# Patient Record
Sex: FEMALE | Race: WHITE
[De-identification: names, ages, dates, MRNs, and addresses within clinical notes are randomized per-mention and may not be internally consistent; named-entity substitution may affect disease eponyms.]

---

## 2017-03-22 ENCOUNTER — HOSPITAL ENCOUNTER (OUTPATIENT)
Age: 57
Discharge: HOME | End: 2017-03-22
Payer: COMMERCIAL

## 2017-03-22 DIAGNOSIS — Z98.84: ICD-10-CM

## 2017-03-22 DIAGNOSIS — E03.9: Primary | ICD-10-CM

## 2017-07-28 ENCOUNTER — HOSPITAL ENCOUNTER (OUTPATIENT)
Dept: HOSPITAL 21 - PCC | Age: 57
Setting detail: OBSERVATION
LOS: 2 days | Discharge: HOME | End: 2017-07-30
Attending: FAMILY MEDICINE | Admitting: FAMILY MEDICINE
Payer: COMMERCIAL

## 2017-07-28 ENCOUNTER — HOSPITAL ENCOUNTER (EMERGENCY)
Dept: HOSPITAL 76 - ED | Age: 57
Discharge: ANOTHER HOSPITAL | End: 2017-07-28
Payer: COMMERCIAL

## 2017-07-28 ENCOUNTER — HOSPITAL ENCOUNTER (OUTPATIENT)
Dept: HOSPITAL 76 - EMS | Age: 57
Discharge: ANOTHER HOSPITAL | End: 2017-07-28
Attending: SURGERY
Payer: COMMERCIAL

## 2017-07-28 ENCOUNTER — HOSPITAL ENCOUNTER (OUTPATIENT)
Dept: HOSPITAL 76 - EMS | Age: 57
Discharge: ANOTHER HOSPITAL | End: 2017-07-28
Attending: SURGERY
Payer: COMMERCIAL

## 2017-07-28 VITALS — DIASTOLIC BLOOD PRESSURE: 61 MMHG | SYSTOLIC BLOOD PRESSURE: 112 MMHG

## 2017-07-28 VITALS
OXYGEN SATURATION: 100 % | RESPIRATION RATE: 11 BRPM | DIASTOLIC BLOOD PRESSURE: 54 MMHG | SYSTOLIC BLOOD PRESSURE: 122 MMHG | HEART RATE: 52 BPM

## 2017-07-28 VITALS
RESPIRATION RATE: 12 BRPM | DIASTOLIC BLOOD PRESSURE: 59 MMHG | OXYGEN SATURATION: 97 % | SYSTOLIC BLOOD PRESSURE: 132 MMHG | HEART RATE: 56 BPM

## 2017-07-28 VITALS — WEIGHT: 153.44 LBS | BODY MASS INDEX: 24.37 KG/M2 | HEIGHT: 66.5 IN

## 2017-07-28 DIAGNOSIS — I45.10: ICD-10-CM

## 2017-07-28 DIAGNOSIS — R07.9: ICD-10-CM

## 2017-07-28 DIAGNOSIS — R07.89: Primary | ICD-10-CM

## 2017-07-28 DIAGNOSIS — Z79.01: ICD-10-CM

## 2017-07-28 DIAGNOSIS — D64.9: ICD-10-CM

## 2017-07-28 DIAGNOSIS — E03.9: ICD-10-CM

## 2017-07-28 DIAGNOSIS — Z98.84: ICD-10-CM

## 2017-07-28 DIAGNOSIS — I21.3: Primary | ICD-10-CM

## 2017-07-28 DIAGNOSIS — R74.0: ICD-10-CM

## 2017-07-28 DIAGNOSIS — Z79.82: ICD-10-CM

## 2017-07-28 DIAGNOSIS — R73.03: ICD-10-CM

## 2017-07-28 DIAGNOSIS — R94.31: ICD-10-CM

## 2017-07-28 DIAGNOSIS — I21.4: Primary | ICD-10-CM

## 2017-07-28 DIAGNOSIS — E66.01: ICD-10-CM

## 2017-07-28 DIAGNOSIS — R07.9: Primary | ICD-10-CM

## 2017-07-28 DIAGNOSIS — G43.909: ICD-10-CM

## 2017-07-28 DIAGNOSIS — I10: ICD-10-CM

## 2017-07-28 DIAGNOSIS — F32.9: ICD-10-CM

## 2017-07-28 LAB
ALBUMIN/GLOB SERPL: 1.2 {RATIO} (ref 1–2.2)
ANION GAP SERPL CALCULATED.4IONS-SCNC: 8 MMOL/L (ref 6–13)
BASOPHILS % (AUTO): 0.4 % (ref 0–3)
BASOPHILS NFR BLD AUTO: 0 10^3/UL (ref 0–0.1)
BASOPHILS NFR BLD AUTO: 0.8 %
BILIRUB BLD-MCNC: 0.7 MG/DL (ref 0.2–1)
BUN SERPL-MCNC: 10 MG/DL (ref 6–20)
CALCIUM UR-MCNC: 9 MG/DL (ref 8.5–10.3)
CHLORIDE SERPL-SCNC: 107 MMOL/L (ref 101–111)
CO2 SERPL-SCNC: 25 MMOL/L (ref 21–32)
CREAT SERPLBLD-SCNC: 0.6 MG/DL (ref 0.4–1)
CREATINE KINASE: 420 U/L (ref 21–215)
EOSINOPHIL # BLD AUTO: 0.2 10^3/UL (ref 0–0.7)
EOSINOPHIL NFR BLD AUTO: 2.5 %
EOSINOPHILS % (AUTO): 0.6 % (ref 0–5)
ERYTHROCYTE [DISTWIDTH] IN BLOOD BY AUTOMATED COUNT: 13.3 % (ref 12–15)
GFRSERPLBLD MDRD-ARVRAT: 103 ML/MIN/{1.73_M2} (ref 89–?)
GLOBULIN SER-MCNC: 3.2 G/DL (ref 2.1–4.2)
GLUCOSE SERPL-MCNC: 99 MG/DL (ref 70–100)
HCT VFR BLD AUTO: 38.4 % (ref 37–47)
HGB UR QL STRIP: 12.7 G/DL (ref 12–16)
INR: 1 RATIO
LIPASE SERPL-CCNC: 30 U/L (ref 22–51)
LYMPHOCYTES # SPEC AUTO: 1.2 10^3/UL (ref 1.5–3.5)
LYMPHOCYTES NFR BLD AUTO: 19.1 %
MAGNESIUM SERPL-MCNC: 2 MG/DL (ref 1.7–2.8)
MAGNESIUM: 2 MG/DL (ref 1.6–2.6)
MCH RBC QN AUTO: 28.2 PG (ref 27–35)
MCH RBC QN AUTO: 28.6 PG (ref 27–31)
MCHC RBC AUTO-ENTMCNC: 33.1 G/DL (ref 32–36)
MCV RBC AUTO: 86.2 FL (ref 81–99)
MEAN CORPUSCULAR VOLUME: 87.8 FL (ref 81–100)
MONOCYTES # BLD AUTO: 0.5 10^3/UL (ref 0–1)
MONOCYTES % (AUTO): 9.8 % (ref 4–12)
MONOCYTES NFR BLD AUTO: 7.6 %
NEUTROPHILS # BLD AUTO: 4.5 10^3/UL (ref 1.5–6.6)
NEUTROPHILS # SNV AUTO: 6.4 X10^3/UL (ref 4.8–10.8)
NEUTROPHILS NFR BLD AUTO: 70 %
NEUTROPHILS NFR BLD AUTO: 75.5 % (ref 40–74)
NRBC # BLD AUTO: 0 /100WBC
PDW BLD AUTO: 7.6 FL (ref 7.9–10.8)
PLATELET COUNT: 226 BIL/L (ref 150–400)
POTASSIUM SERPL-SCNC: 3.9 MMOL/L (ref 3.5–5)
PROT SPEC-MCNC: 6.9 G/DL (ref 6.7–8.2)
RBC MAR: 4.45 10^6/UL (ref 4.2–5.4)
SODIUM SERPLBLD-SCNC: 140 MMOL/L (ref 135–145)
TROPONIN T SERPL-MCNC: 1 UG/L (ref 0–0.01)
WBC # BLD: 6.4 X10^3/UL

## 2017-07-28 PROCEDURE — 96361 HYDRATE IV INFUSION ADD-ON: CPT

## 2017-07-28 PROCEDURE — 93454 CORONARY ARTERY ANGIO S&I: CPT

## 2017-07-28 PROCEDURE — 96375 TX/PRO/DX INJ NEW DRUG ADDON: CPT

## 2017-07-28 PROCEDURE — 83735 ASSAY OF MAGNESIUM: CPT

## 2017-07-28 PROCEDURE — 96372 THER/PROPH/DIAG INJ SC/IM: CPT

## 2017-07-28 PROCEDURE — 80053 COMPREHEN METABOLIC PANEL: CPT

## 2017-07-28 PROCEDURE — 99152 MOD SED SAME PHYS/QHP 5/>YRS: CPT

## 2017-07-28 PROCEDURE — 96374 THER/PROPH/DIAG INJ IV PUSH: CPT

## 2017-07-28 PROCEDURE — 83690 ASSAY OF LIPASE: CPT

## 2017-07-28 PROCEDURE — 85014 HEMATOCRIT: CPT

## 2017-07-28 PROCEDURE — 83036 HEMOGLOBIN GLYCOSYLATED A1C: CPT

## 2017-07-28 PROCEDURE — 82550 ASSAY OF CK (CPK): CPT

## 2017-07-28 PROCEDURE — 71020: CPT

## 2017-07-28 PROCEDURE — 84443 ASSAY THYROID STIM HORMONE: CPT

## 2017-07-28 PROCEDURE — 85018 HEMOGLOBIN: CPT

## 2017-07-28 PROCEDURE — 84100 ASSAY OF PHOSPHORUS: CPT

## 2017-07-28 PROCEDURE — 85730 THROMBOPLASTIN TIME PARTIAL: CPT

## 2017-07-28 PROCEDURE — 99285 EMERGENCY DEPT VISIT HI MDM: CPT

## 2017-07-28 PROCEDURE — 99153 MOD SED SAME PHYS/QHP EA: CPT

## 2017-07-28 PROCEDURE — 96376 TX/PRO/DX INJ SAME DRUG ADON: CPT

## 2017-07-28 PROCEDURE — 36415 COLL VENOUS BLD VENIPUNCTURE: CPT

## 2017-07-28 PROCEDURE — 80048 BASIC METABOLIC PNL TOTAL CA: CPT

## 2017-07-28 PROCEDURE — 93005 ELECTROCARDIOGRAM TRACING: CPT

## 2017-07-28 PROCEDURE — 80061 LIPID PANEL: CPT

## 2017-07-28 PROCEDURE — 82553 CREATINE MB FRACTION: CPT

## 2017-07-28 PROCEDURE — 85025 COMPLETE CBC W/AUTO DIFF WBC: CPT

## 2017-07-28 PROCEDURE — 84484 ASSAY OF TROPONIN QUANT: CPT

## 2017-07-28 PROCEDURE — 84439 ASSAY OF FREE THYROXINE: CPT

## 2017-07-28 PROCEDURE — 85610 PROTHROMBIN TIME: CPT

## 2017-07-28 RX ADMIN — FOLIC ACID SCH MLS/HR: 5 INJECTION, SOLUTION INTRAMUSCULAR; INTRAVENOUS; SUBCUTANEOUS at 16:08

## 2017-07-28 RX ADMIN — Medication SCH ML: at 23:19

## 2017-07-28 RX ADMIN — NITROGLYCERIN STA MG: 0.4 TABLET, ORALLY DISINTEGRATING SUBLINGUAL at 10:57

## 2017-07-28 RX ADMIN — Medication SCH ML: at 16:30

## 2017-07-28 RX ADMIN — NITROGLYCERIN STA MG: 0.4 TABLET, ORALLY DISINTEGRATING SUBLINGUAL at 10:31

## 2017-07-28 NOTE — PCM.ADCARE
Advance Care Planning Note


Plan:


Purpose of encounter: Goals of care





Parties in attendance:


The patient, Dr. King, medical students (Marvin Arevalo and Carie Kumar)





Diagnoses:


NSTEMI


Hypothyroidism


Depression


Migraines





Decisional capacity: Good





Plan: The patient is aware of the current diagnosis and would like to continue 

to be full code.  The patient understands that this means for chest compressions

, intubation, pressors, and all measures involved with CPR.





CODE STATUS: Full code





Time spent with advanced care planning: Greater than 16 minutes








Erika Knig DO Jul 28, 2017 18:47

## 2017-07-28 NOTE — XRAY PRELIMINARY REPORT
Accession: [id number]

Exam: XR Chest 2 View PA/LAT

 

Impression: Atherosclerosis of the aorta.

 

No evidence of a mass or infiltrate.

 

[facility]

 

SITE ID: [id number]

## 2017-07-28 NOTE — PCM.HPMED
Subjective


Date of Service


2017


Primary Provider:


Admitting Physician: Erika King DO


Primary Care Physician: Augusta Dailey DO 


Attending Physician: Erika King DO


Chief Complaint:


Chest pain





History of Present Illness:


56 year old  female with a hx of hypothyroidism and migraines, who 

comes to [facility] from [facility] ED after experiencing chest pain this morning and 

found to have lab values suggestive of an NSTEMI in the ED. 


The patient reports that this morning around 0830, while at work, she began to 

have chest tightness. She states that over the course of about a half an hour 

the tightness worsened and began to radiate to her right shoulder. She 

describes the pain as a band across my chest. She rates the pain at 8/10 at 

its worst. Nothing seemed to make the pain better or worse. She had associated 

nausea, diaphoresis, and difficulty taking deep breaths. She reports that she 

had a similar chest tightness four days ago. At that time, the pain was mild 

and intermittent, and resolved. She states that exertion did not seem to 

influence her pain. Prior to four days ago, she denies ever having any chest 

pain or having any history of heart problems. She is non a smoker. She notes 

she has been increasingly stressed at work recently. Today, after the chest 

pain did not resolve, she called EMS and was brought to the [facility] ED for 

evaluation. 





At the ED, her EKG showed no ST changes. First troponin was positive at 1.28, 

second was positive at 2.44. She was diagnosed with NSTEMI. They administered 2 

doses of morphine, NTG x 3. After the nitro, she reports her chest pain 

improved considerably to 5/10. After the NTG she did begin to have a mild 

headache. She was then transferred to [facility] for further cardio workup. 





On admission, she complains of mild, 1/10 chest tightness that makes it 

difficult to take a deep breath. She is also having mild nausea. She currently 

denies shortness of breath or radiating pain. She denies any vision changes, 

weakness, dizziness, syncope, abdominal pain, vomiting, constipation, leg 

swelling, leg pain, or numbness.





Review of Systems:


Comprehensive ROS negative except as listed above in the HPI





Allergies


Coded Allergies:  


     erythromycin base (Verified  Allergy, Unknown, 17)





Home Medications


Levothyroxine 112 mcg daily


Sertraline 50 mg daily


Gabapentin 300 mg q8 PRN





PMH


Syncopal episode 2017 with subsequent workup at the ED that was unrevealing. 


Migraine headaches


Hypothyroidism


HTN resolved post gastric sleeve weight loss. 


.


Surgical History


Cholecystectomy (2009)


Gastric sleeve ()





Family History


Father: CAD with pacemaker


Brother:  heart rhythm problems


Mother: DMII with cardiac disease ()





Social History


Occupation:  Manager at [employer]


 Alcohol Use:  No


Hx Substance Use:  No


Hx Tobacco Use:  No


Smoking Status:  Never Smoker








Living Arrangement:   with Family











Exam


Vital Signs





 Vital Sign - Last








  Date Time  Temp Pulse Resp B/P Pulse Ox O2 Delivery O2 Flow Rate FiO2


 


17 15:57 36.5 52 11 122/54 100 Room Air  








Exam


General: pleasant female, resting comfortably on the gurney, awake, no obvious 

distress, conversing normally


HEENT: PERRLA, EOMI, nonicteric, membranes moist, autramatic, normocephalic 


Neck: Supple, non tender, no thyromegaly, no JVD


Cardio: bradycardic, normal rhythm, with 2/6 systolic murmur. No S3/4. Normal 

cap refill. +2/4 dp and radial pulses bilaterally. 


Respiratory: CTA bilaterally, with no wheezes, rales, or rhonchi


Abdomen: Soft, normal bowel tones, nontender, nondistended


Extremities: no edema, no signs of trauma


Psych: Appropriate mood and affect. 


Neuro: CN II through XII grossly intact, sensation intact throughout. Moving 

all extremities well. Speech is fluent and appropriate. 


Skin: no bruising, erythema, or ulcers. 


.





Lab and Diagnostics


12-lead ECG


NSR with rate 55, no acute ST elevation





Assessment & Plan


This is a 56 year old female with a hx of hypothyroidism, who presents from the 

[facility] ED after an episode of severe chest pain this morning while at work. 

She had positive troponin in the ED (1.28 increasing to 2.44), and was 

transferred for further eval of NSTEMI. 





Likely Acute NSTEMI, POA, active. 


-EKG at [facility] showed no ST changes. Initial troponins positive. 


-Prior to transfer, Received ASA, NTGx3, MS x2 in ED at [facility]. 


-Trend troponin here overnight every four hours. Repeat EKG showed NSR with no 

ST segment changes


-Morphine IV 4 mg as needed for pain. 


-Schedule for Echo tomorrow morning. 


-Schedule treadmill stress test for tomorrow. 


-Will start atorvastatin 40 mg. Continue on metoprolol tartrate BID (hold if HR 

below 55 bpm) 


-Continue on plavix 75 mg daily. Daily aspirin 81 mg. 


-Cardiology eval in the morning. 


-Heparin Drip


-NPO after midnight





Hypothyroidism, POA. chronic. Presumed Stable


-Will check TSH and free T4 


-Continue home levothyroxine dose





Depression,POA chronic. Stable


-continue on home dose of sertraline. 





Migraines, POA, chronic. Stable


-Patient uses Gabapentin 300 mg PRN for migraines which apparently works well 

for her





Disposition: Observation, patient will undergo cardiac evaluation and risk 

stratification, will likely be able to DC home with no needs if cardiac 

evaluation is benign


Pain Evaluation:  Adequate Pain Control


Resuscitation Status:  CPR: Attempt Resuscitation


Attending Statement


The patient was seen and examined together with Dr. Bean on 17 and I 

agree with the history, exam and plan as outlined in the note above.








Yonny Bean DO 2017 17:01


Erika King DO 2017 14:45

## 2017-07-28 NOTE — XRAY REPORT
EXAM:

CHEST RADIOGRAPHY

 

EXAM DATE: 7/28/2017 10:46 AM.

 

CLINICAL HISTORY: Chest pain since 8:30.

 

COMPARISON: None.

 

TECHNIQUE: 2 views.

 

FINDINGS: 

There is atherosclerosis of the aorta. The heart is enlarged. There is no pulmonary mass, infiltrate,
 pleural effusion or pneumothorax seen.

 

Impression: Atherosclerosis of the aorta.

 

No evidence of a mass or infiltrate.

 

[facility]

Referring Provider Line: [phone]

 

SITE ID: [id number]

## 2017-07-28 NOTE — ED PHYSICIAN DOCUMENTATION
PD HPI CHEST PAIN





- Stated complaint


Stated Complaint: CP





- Chief complaint


Chief Complaint: Cardiac





- History obtained from


History obtained from: Patient, EMS





- History of Present Illness


Timing - onset: How many hours ago (1 hour ago at 8:30 while at work at [employer] (

she  there), nonexertional onset.), Today


Timing - onset during: Light activity


Timing - details: Abrupt onset, Still present (lessened enroute but not gone.)


Quality: Pressure, Aching


Location: Substernal, Left chest


Radiation: Back, Left upper extremity


Improved by: No: Rest


Worsened by: No: Inspiration, Movement, Palpation


Associated symptoms: Shortness of air, Diaphoresis, Nausea, Feeling faint / 

dizzy.  No: General Weakness, Palpitations, Cough


Similar symptoms before: No diagnosis (had similar less severe symptoms 

undulating for a few hours about 2-3 days ago, nonexertional and then went away.

)


Recently seen: Not recently seen





Review of Systems


Constitutional: denies: Fever, Chills


Nose: denies: Rhinorrhea / runny nose, Congestion


Throat: denies: Sore throat


Cardiac: denies: Palpitations, Pedal edema, Calf pain


Respiratory: denies: Dyspnea, Cough


GI: reports: Nausea.  denies: Abdominal Pain, Vomiting, Diarrhea


: denies: Dysuria, Frequency


Skin: denies: Rash, Lesions


Musculoskeletal: denies: Neck pain, Back pain


Neurologic: denies: Focal weakness, Numbness, Near syncope


Psychiatric: denies: Depressed, Anxiety


Immunocompromised: denies: Immunocompromised





PD PAST MEDICAL HISTORY





- Past Medical History


Cardiovascular: Hypertension (but is better after losing weight)


Respiratory: None


Neuro: None


Endocrine/Autoimmune: None





- Present Medications


Home Medications: 


 Ambulatory Orders











 Medication  Instructions  Recorded  Confirmed


 


Gabapentin  07/28/17 


 


Levothyroxine [Synthroid] 112 mcg PO DAILY 07/28/17 07/28/17


 


Sertraline [Zoloft] 50 mg PO DAILY 07/28/17 07/28/17














- Allergies


Allergies/Adverse Reactions: 


 Allergies











Allergy/AdvReac Type Severity Reaction Status Date / Time


 


amoxicillin Allergy  Hives Verified 07/28/17 09:42














- Living Situation


Living Situation: reports: With spouse/s.o.


Living Arrangement: reports: At home





- Social History


Does the pt smoke?: No


Does the pt have substance abuse?: No





- Family History


Family history: reports: CAD (father has pacemaker and brother with irregular 

heart beats).  denies: Sudden death, Venous thromboembolism





PD ED PE NORMAL





- Vitals


Vital signs reviewed: Yes





- General


General: Alert and oriented X 3, No acute distress, Well developed/nourished





- HEENT


HEENT: Moist mucous membranes, Pharynx benign





- Neck


Neck: Supple, no meningeal sign, No adenopathy, No JVD, No bruit





- Cardiac


Cardiac: RRR, No gallop, No rub, Other (2/6 murmur left chest without radiation 

to neck. PMI slightly lateral. )





- Respiratory


Respiratory: Clear bilaterally, Other (very slight chestwall tenderness left 

chest, without really reproducing her symptoms. )





- Abdomen


Abdomen: Soft, Non tender





- Female 


Female : Deferred





- Rectal


Rectal: Deferred





- Back


Back: No CVA TTP





- Derm


Derm: Normal color





- Extremities


Extremities: No deformity, No tenderness to palpate, Normal ROM s pain, No edema

, No calf tenderness / cord





- Neuro


Neuro: Alert and oriented X 3, No motor deficit, Normal speech





- Psych


Psych: Normal mood, Normal affect





Results





- Vitals


Vitals: 


 Vital Signs - 24 hr











  07/28/17 07/28/17 07/28/17





  09:34 09:51 10:51


 


Temperature 36.2 C L  


 


Heart Rate 73  52 L


 


Respiratory 20  11 L





Rate   


 


Blood Pressure 138/90 H  132/69 H


 


Blood Pressure  138/90 H 





[Left]   


 


Blood Pressure  153/75 H 





[Right]   


 


O2 Saturation 100  100














  07/28/17 07/28/17 07/28/17





  11:09 12:06 12:13


 


Temperature   


 


Heart Rate 58 L 54 L 55 L


 


Respiratory  16 10 L





Rate   


 


Blood Pressure 131/73 H 125/66 125/66


 


Blood Pressure   





[Left]   


 


Blood Pressure   





[Right]   


 


O2 Saturation 96 99 99








 Oxygen











O2 Source                      Room air

















- EKG (time done)


  ** 09:41


Rate: Rate (enter#) (90)


Rhythm: NSR


Axis: Normal


Intervals: Normal NC


QRS: Normal


Ischemia: Normal ST segments.  No: ST elevation c/w ischemia, ST depression


Compare to prior EKG: Old EKG unavailable





- Labs


Labs: 


 Laboratory Tests











  07/28/17 07/28/17 07/28/17





  10:30 10:30 10:30


 


WBC  6.4  


 


RBC  4.45  


 


Hgb  12.7  


 


Hct  38.4  


 


MCV  86.2  


 


MCH  28.6  


 


MCHC  33.1  


 


RDW  13.3  


 


Plt Count  229  


 


MPV  7.6 L  


 


Neut #  4.5  


 


Lymph #  1.2 L  


 


Mono #  0.5  


 


Eos #  0.2  


 


Baso #  0.0  


 


Absolute Nucleated RBC  0.00  


 


Nucleated RBCs  0.0  


 


Sodium   140 


 


Potassium   3.9 


 


Chloride   107 


 


Carbon Dioxide   25 


 


Anion Gap   8.0 


 


BUN   10 


 


Creatinine   0.6 


 


Estimated GFR (MDRD)   103 


 


Glucose   99 


 


Calcium   9.0 


 


Magnesium   2.0 


 


Total Bilirubin   0.7 


 


AST   25 


 


ALT   13 


 


Alkaline Phosphatase   40 L 


 


Troponin I    1.28 H*


 


Total Protein   6.9 


 


Albumin   3.7 


 


Globulin   3.2 


 


Albumin/Globulin Ratio   1.2 


 


Lipase   30 














  07/28/17





  11:55


 


WBC 


 


RBC 


 


Hgb 


 


Hct 


 


MCV 


 


MCH 


 


MCHC 


 


RDW 


 


Plt Count 


 


MPV 


 


Neut # 


 


Lymph # 


 


Mono # 


 


Eos # 


 


Baso # 


 


Absolute Nucleated RBC 


 


Nucleated RBCs 


 


Sodium 


 


Potassium 


 


Chloride 


 


Carbon Dioxide 


 


Anion Gap 


 


BUN 


 


Creatinine 


 


Estimated GFR (MDRD) 


 


Glucose 


 


Calcium 


 


Magnesium 


 


Total Bilirubin 


 


AST 


 


ALT 


 


Alkaline Phosphatase 


 


Troponin I  2.44 H*


 


Total Protein 


 


Albumin 


 


Globulin 


 


Albumin/Globulin Ratio 


 


Lipase 














PD MEDICAL DECISION MAKING





- ED course


Complexity details: reviewed results (Elevated troponin with non-ST ECG. ), re-

evaluated patient (pain minimal initially, but did increase some here. Given 

NTG and Morphine, with some subsequent nausea. Repeat ECG without ST elevations 

still, but RBBB is resolved. Talked with Cardiology, Dr. Bernal, who refers 

to Hospitalist.  ), considered differential (concerning for acute MI, ACS, lung 

related. Will get ECG, CXR, troponin and basic labs. ), d/w patient, d/w 

consultant (Dolores, Cardiology. )





Departure





- Departure


Disposition: 02 Transfer Acute Care Hosp


Clinical Impression: 


Chest pain


Qualifiers:


 Chest pain type: precordial pain Qualified Code(s): R07.2 - Precordial pain





Myocardial infarction


Qualifiers:


 Myocardial infarction ST status: non-ST elevation myocardial infarction 

Qualified Code(s): I21.4 - Non-ST elevation (NSTEMI) myocardial infarction


Condition: Stable


Record reviewed to determine appropriate education?: Yes

## 2017-07-28 NOTE — NUR
Admit.. Received this afternoon as a direct admit to [address] [facility] ER via [facility] 
[facility]. Pt arrived pain free and remains so other than a headache. Med with Tylenol. Pt 
has Heparin gtt ordered, but she received a dose of Lovenex at noon in ER at [facility]. 
Pharmacist recommends not starting heparin until 2100 tonight. Call will be placed to update 
night resident.

## 2017-07-29 VITALS
OXYGEN SATURATION: 98 % | RESPIRATION RATE: 16 BRPM | DIASTOLIC BLOOD PRESSURE: 61 MMHG | SYSTOLIC BLOOD PRESSURE: 120 MMHG | HEART RATE: 52 BPM

## 2017-07-29 VITALS — RESPIRATION RATE: 15 BRPM | DIASTOLIC BLOOD PRESSURE: 54 MMHG | HEART RATE: 52 BPM | SYSTOLIC BLOOD PRESSURE: 120 MMHG

## 2017-07-29 VITALS — DIASTOLIC BLOOD PRESSURE: 55 MMHG | SYSTOLIC BLOOD PRESSURE: 116 MMHG | HEART RATE: 50 BPM | RESPIRATION RATE: 16 BRPM

## 2017-07-29 VITALS
SYSTOLIC BLOOD PRESSURE: 128 MMHG | DIASTOLIC BLOOD PRESSURE: 57 MMHG | HEART RATE: 53 BPM | RESPIRATION RATE: 18 BRPM | OXYGEN SATURATION: 98 %

## 2017-07-29 VITALS — SYSTOLIC BLOOD PRESSURE: 129 MMHG | RESPIRATION RATE: 17 BRPM | DIASTOLIC BLOOD PRESSURE: 56 MMHG | HEART RATE: 50 BPM

## 2017-07-29 VITALS — HEART RATE: 58 BPM

## 2017-07-29 VITALS
HEART RATE: 63 BPM | RESPIRATION RATE: 19 BRPM | DIASTOLIC BLOOD PRESSURE: 52 MMHG | SYSTOLIC BLOOD PRESSURE: 115 MMHG | OXYGEN SATURATION: 96 %

## 2017-07-29 VITALS — SYSTOLIC BLOOD PRESSURE: 118 MMHG | HEART RATE: 56 BPM | DIASTOLIC BLOOD PRESSURE: 55 MMHG | RESPIRATION RATE: 16 BRPM

## 2017-07-29 VITALS
HEART RATE: 62 BPM | DIASTOLIC BLOOD PRESSURE: 68 MMHG | RESPIRATION RATE: 16 BRPM | OXYGEN SATURATION: 99 % | SYSTOLIC BLOOD PRESSURE: 125 MMHG

## 2017-07-29 VITALS — DIASTOLIC BLOOD PRESSURE: 57 MMHG | HEART RATE: 52 BPM | RESPIRATION RATE: 15 BRPM | SYSTOLIC BLOOD PRESSURE: 116 MMHG

## 2017-07-29 VITALS — DIASTOLIC BLOOD PRESSURE: 55 MMHG | HEART RATE: 56 BPM | RESPIRATION RATE: 16 BRPM | SYSTOLIC BLOOD PRESSURE: 122 MMHG

## 2017-07-29 VITALS — DIASTOLIC BLOOD PRESSURE: 68 MMHG | SYSTOLIC BLOOD PRESSURE: 119 MMHG | HEART RATE: 53 BPM | RESPIRATION RATE: 18 BRPM

## 2017-07-29 VITALS
SYSTOLIC BLOOD PRESSURE: 168 MMHG | RESPIRATION RATE: 16 BRPM | HEART RATE: 45 BPM | DIASTOLIC BLOOD PRESSURE: 63 MMHG | OXYGEN SATURATION: 100 %

## 2017-07-29 VITALS — SYSTOLIC BLOOD PRESSURE: 128 MMHG | HEART RATE: 45 BPM | DIASTOLIC BLOOD PRESSURE: 57 MMHG | RESPIRATION RATE: 14 BRPM

## 2017-07-29 VITALS
RESPIRATION RATE: 14 BRPM | SYSTOLIC BLOOD PRESSURE: 126 MMHG | HEART RATE: 51 BPM | OXYGEN SATURATION: 100 % | DIASTOLIC BLOOD PRESSURE: 70 MMHG

## 2017-07-29 VITALS — DIASTOLIC BLOOD PRESSURE: 66 MMHG | RESPIRATION RATE: 20 BRPM | HEART RATE: 48 BPM | SYSTOLIC BLOOD PRESSURE: 158 MMHG

## 2017-07-29 VITALS — DIASTOLIC BLOOD PRESSURE: 48 MMHG | HEART RATE: 55 BPM | SYSTOLIC BLOOD PRESSURE: 122 MMHG | RESPIRATION RATE: 16 BRPM

## 2017-07-29 VITALS — RESPIRATION RATE: 22 BRPM | DIASTOLIC BLOOD PRESSURE: 56 MMHG | SYSTOLIC BLOOD PRESSURE: 146 MMHG | HEART RATE: 50 BPM

## 2017-07-29 VITALS — HEART RATE: 53 BPM

## 2017-07-29 VITALS — SYSTOLIC BLOOD PRESSURE: 110 MMHG | RESPIRATION RATE: 17 BRPM | HEART RATE: 50 BPM | DIASTOLIC BLOOD PRESSURE: 58 MMHG

## 2017-07-29 VITALS
DIASTOLIC BLOOD PRESSURE: 52 MMHG | SYSTOLIC BLOOD PRESSURE: 103 MMHG | OXYGEN SATURATION: 98 % | RESPIRATION RATE: 14 BRPM | HEART RATE: 42 BPM

## 2017-07-29 VITALS — DIASTOLIC BLOOD PRESSURE: 49 MMHG | SYSTOLIC BLOOD PRESSURE: 133 MMHG | HEART RATE: 57 BPM | RESPIRATION RATE: 14 BRPM

## 2017-07-29 LAB
BASOPHILS % (AUTO): 0.3 % (ref 0–3)
EOSINOPHILS % (AUTO): 1.4 % (ref 0–5)
MAGNESIUM: 2.1 MG/DL (ref 1.6–2.6)
MCH RBC QN AUTO: 28.4 PG (ref 27–35)
MEAN CORPUSCULAR VOLUME: 88.8 FL (ref 81–100)
MONOCYTES % (AUTO): 6.1 % (ref 4–12)
NEUTROPHILS NFR BLD AUTO: 67 % (ref 40–74)
PLATELET COUNT: 213 BIL/L (ref 150–400)

## 2017-07-29 RX ADMIN — Medication SCH ML: at 17:59

## 2017-07-29 RX ADMIN — FOLIC ACID SCH MLS/HR: 5 INJECTION, SOLUTION INTRAMUSCULAR; INTRAVENOUS; SUBCUTANEOUS at 05:10

## 2017-07-29 RX ADMIN — FOLIC ACID SCH MLS/HR: 5 INJECTION, SOLUTION INTRAMUSCULAR; INTRAVENOUS; SUBCUTANEOUS at 17:59

## 2017-07-29 RX ADMIN — Medication SCH ML: at 08:30

## 2017-07-29 NOTE — NUR
Pedal pulses



Pedal pulses have been 2+ since return to floor.  this was not indicated on the flow sheet, 
but pt's pulses were checked per protocol and have remained 2+.

## 2017-07-29 NOTE — NUR
Chest pain:



@ 1135 patient reported 4/10 CP after ambulating to [facility]. Pt placed on 2L NC, Stat EKG was 
ordered and reviewed by Dr Dewitt and Hospitalist. Nitro sublingual X2 given. Morphine 2mg 
IVP administered X2 patient states pain is better and tolerating 2/10 pain. Dr Willams at 
bedside assessing pt at this time. Rec'd call from Cath team and stated they would be up 
shortly. Pt on telemetry and close monitoring in place.

## 2017-07-29 NOTE — CS94
[address] [address]

 

                       DIAGNOSTIC CARDIAC CATHETERIZATION

 

PATIENT:  CAIO FLYNN                                         : 1960

ACCOUNT#: [id number]                                            MR#: [id number]

ADMIT:    2017

JOB ID:   [id number]

 

 

SERVICE DATE:

2017

 

PROCEDURE NOTE -- CARDIAC CATHETERIZATION LABORATORY:

:

Eddie Willams MD

 

PROCEDURE:

Coronary angiogram -- urgent.

 

CLINICAL DETAILS:

This 56-year-old woman presents to the Cardiac Catheterization Laboratory for

diagnostic coronary angiogram and consideration of PCI after she was admitted

yesterday in transfer from \[facility]"" where she had presented with

a severe prolonged episode of retrosternal squeezing.  The symptoms were noted

to be associated with idioventricular rhythm.  ECG was unremarkable.  The

working diagnosis was NSTEMI after troponin became elevated above 2 at the

outside hospital and 0.999 here with CK total slightly elevated above 400 and

CK-MB of 51.  Catheterization was undertaken today urgently because she had

recurrent chest discomfort.  Underlying CAD risk factors include history of

hypertension that resolved after gastric bypass surgery; and family history.

 

Echocardiogram shows intact LV function with a subtle limited region of

inferolateral hypokinesis.

 

PROCEDURAL DETAILS:

I met with her prior to the procedure and discussed the findings, impressions

and management considerations including the recommendation to proceed to

coronary angiogram urgently for definitive diagnosis and to guide treatment

decisions including medical therapy; percutaneous coronary intervention (PCI);

or bypass surgery if needed.  We discussed the procedure including possible

risks and complications.  We discussed bleeding, infection, blood clots; as

well as injury to nerve, artery, vein or kidney; and also arrhythmia, drug

reaction; or others.  We discussed treatment as needed including surgery,

pacemaker, transfusion.  We discussed more serious complications that are

possible including stroke, heart attack, cardiac arrest, death and emergency

surgery including transfer for coronary bypass surgery.  After discussion and

questions, she signed informed consent to proceed.  She was brought to the

Cardiac Catheterization Laboratory where she was prepped sterilely and draped.

 

CORONARY ANGIOGRAM:  Arterial access was obtained in the right common femoral

artery without difficulty using fluoroscopic localization over the femoral

head; and local lidocaine anesthesia; and modified Seldinger technique to

insert a 10 cm 6-French side-arm sheath.  Catheters were advanced and exchanged

over a long 0.035 inch J-tipped guidewire.  The left coronary artery was

engaged with a 6-French JL-4 diagnostic catheter.  The right coronary artery

was engaged with a 6-French JR-4 catheter.  LV not entered.  A 6-French pigtail

catheter was advanced to the aortic root but would not enter the LV easily in

association with her thickened and calcified aortic valve with moderate aortic

stenosis by echo.

 

Procedure without difficulty.  Patient tolerated procedure well.  No

complications.  A side-arm sheath angiogram shows adequate access in the right

common femoral artery for a closure device.  Arterial hemostasis was obtained

without difficulty using a 6-French StarClose clip.  The patient was

transferred chest-pain free and in stable condition from the Catheterization

Laboratory back to the CCU unit for ongoing care including by the primary

hospitalist service and Cardiology.  I discussed the findings, impression and

management considerations with her; with her ; with Cardiology, Dr. Dewitt; and with the hospitalist team.

 

FINDINGS:

1.     LMCA intact.

2.     LAD:  Intact.  The distribution includes one large high diagonal branch.

3.     LCX:  The left circumflex coronary artery is a large vessel.  There

is prominent tapering distally and the smaller distal LPL branch has two

branches.  The superior branch is 1 mm but has a tubular segment with an

apparent 90% lesion but NICHOLE-3 flow.

4.     RCA:  Dominant.  Intact.  The RCA is a large vessel with a moderate

sized PDA and a moderate sized but extensive RPLB distribution that reaches

to the apex and lateral wall.

 

CONCLUSIONS:

Coronary angiogram -- Overall near normal coronary angiogram with

atherosclerotic narrowing limited to a lesion of a very small distal branch of

the left posterolateral vessel.

 

RECOMMENDATIONS:

1.     ASA -- indefinitely.

2.     Consider Plavix for ACS (acute coronary syndrome).

3.     Consider treatment for spasm (notes severe headache with nitrates and

bradycardia that may limit beta-blocker use).

 

COMMENT:

This patient had a very high preprocedure likelihood of significant coronary

disease given her typical chest discomfort and elevated enzymes; although ECG

was unremarkable; and echocardiogram has only a limited region of hypokinesis.

The circumflex artery appears atypical but overall there is limited evidence of

atherosclerotic plaquing at all.  There are no lesions for revascularization or

intervention.  The left circumflex artery tapers prominently but the only

lesion seen is in the very small left posterolateral branch.  Spasm is also

considered; but the circumflex did not respond to  mcg IC.

## 2017-07-29 NOTE — CONS
[address] [address]

 

                              CONSULTATION REPORT

 

PATIENT:  CAIO FLYNN                                         : 1960

ACCOUNT#: [id number]                                            MR#: [id number]

ADMIT:    2017

JOB ID:   [id number]

 

 

CARDIOLOGY CONSULTATION:

DATE OF SERVICE:

2017

 

HISTORY OF PRESENT ILLNESS:

This patient is a 56-year-old female with no prior cardiac history, transferred

from [facility] Emergency Department with chest discomfort and

evidence of a non-ST elevation MI.

 

The patient states that on Monday of this last week while at work she noted

intermittent episodes of waxing and waning precordial chest discomfort that

felt like a band around her chest.  Symptoms would persist for a couple of

minutes and then resolve.  She sat down to rest for a period and found the

discomfort seemed to persist intermittently so she went back to work and

eventually the discomfort resolved.  She felt fine the rest of the week and in

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

works and had no symptoms of exertional dyspnea or angina like chest

discomfort.

 

Yesterday morning while she was at work, she developed similar precordial chest

tightness across her chest.  This persisted however instead of waxing and

waning and was more severe radiating across her back and right shoulder as

well.  She felt clammy and nauseated with the discomfort and therefore called

911 and was taken to the emergency department at [facility].  Her

evaluation there demonstrated abnormal troponin levels and the patient

therefore was transferred to [facility] for evaluation.  She has

been treated with anti-platelet therapy in addition to heparin, low-dose beta

blocker therapy and nitrates.  She was unable to tolerate nitrates because of a

severe headache.  Her discomfort persisted until yesterday evening, but

gradually resolved and she has had no recurrent discomfort.  She did have an

episode of nausea and emesis during the night last night at which point she

developed transient third degree AV block which resolved with resolution of her

vomiting and was clearly vagal mediated.  She is resting comfortably and

feeling well this morning without any symptoms.

 

Baseline EKG from yesterday at 9:41 a.m. is notable for what appears to be an

accelerated idioventricular rhythm with resolution and eventual normal sinus

rhythm.  EKG has not been accomplished as of yet this morning.

 

PAST MEDICAL HISTORY:

Includes treated hypothyroidism, history of migraine headaches, and a history

of prior obesity with previous gastric sleeve weight loss surgery with which

she was able to lose 110 pounds.  She has had previous cholecystectomy as well,

and with resolution of her morbid obesity, her hypertension resolved.

 

MEDICATIONS:

Her home medications include levothyroxine, sertraline and gabapentin as needed

for her headaches.

 

ALLERGIES:

Include ERYTHROMYCIN.

 

REVIEW OF SYSTEMS:

Unremarkable without history of bleeding issues.  She has no upcoming

surgeries.  No contraindications to dual anti-platelet therapy.

 

SOCIAL HISTORY:

The patient is a nonsmoker.  She lives at home.  She works as a manager at

[employer] and not unusually works 12-14 hour shifts.

 

PHYSICAL EXAMINATION:

Shows a healthy-appearing, comfortable 56-year-old female.  HEENT examination

is unremarkable.  Her jugular venous pressure looks normal.  She is mildly

bradycardic.  Heart tones are somewhat distant.  There is a soft systolic

aortic ejection murmur at the left upper sternal border.  I do not hear any

ventricular gallops.  Lungs are clear.  Abdomen is unremarkable.  Distal

extremities normal without edema.  Pedal pulses are normal.  No significant

neuromuscular abnormalities.

 

LABORATORY DATA:

Notable for elevated transaminase levels.  Her troponin level is elevated as is

her CK and CK-MB.  Serum creatinine is normal.  Blood sugar is mildly elevated

with a hemoglobin A1c that is slightly elevated and her thyroid function is

normal.  Total cholesterol is low at 148 with an LDL cholesterol of 67, and

this is without any statin medications.

 

IMPRESSION:

The patient clearly has an acute coronary syndrome with non-ST elevation

myocardial infarction.  She is clinically stable with no high risk indicators

at this point, and a fairly benign cardiac exam.  An echocardiogram will be

done later today.  A stress test is certainly not needed and this patient will

be scheduled for diagnostic coronary artery angiography this weekend if there

is a need to call the Cath Lab Team in or if the issue is urgent.  Otherwise we

will schedule the procedure for Monday if she remains stable and no other

urgent interventions are needed over the weekend.

## 2017-07-29 NOTE — NUR
Cardiac/HA



Around 1920 pt had brief episode of CHB w/ HR down to 29-31 (lasted approx 8 secs) from 

HR in the 50's Sinus bradycardia, pt symptomatic with nausea and vomiting, given zofran 8mg 
IV, 

HR back in the 50's sinus bradycardia. Notified Dr Beltran of low HR & MD seen pt at 
bedside.

NPO after midnight for possible heart cath in AM. Notified attending MD of 2nd troponin 
result.



Heparin gtt order clarified, no bolus given, started gtt@ 800units/hr, last ptt 

52, rate increased to 825 units/hr, no s/s of bleeding.



Pt c/o headaches, given tylenol po at noc, neurontin po and IV tylenol this am which helps 
w/

pain.

## 2017-07-29 NOTE — DRSVH
[facility]

                                                          [address] [place] [address]

                                                         Phone [phone]

 

 

 

                            Echocardiogram Report

Name: CAIO FLYNN Fabricio                e: 2017      Height: 66 in

Hospital MRN #: [id number]          Exam Location: [facility]          Weight: 158 lb

Account #: [id number]              Gender: Female

                                                                BSA: 1.8 m2

: 1960                     Age: 56 yrs                 BP: 126/70 mm

Hg

Reason For Study: Chest pain

Ordering Physician: HOSPITALIST [facility] Performed By: Khloe Segundo

Referring Physician: Shanna Dailey

 

Interpretation Summary

The left ventricle is normal in size.

There is mild-moderate concentric left ventricular hypertrophy.

The ejection fraction is estimated to be 60-65%.

There is proximal mid posteriolateral wall hypokinesis.

The right ventricle is normal in size and function.

The aortic valve is mildly calcified.

A bicuspid aortic valve cannot be excluded.

There is moderate aortic stenosis.

No other echocardiographic abnormalities seen.

 

Procedure:   A two-dimensional transthoracic echocardiogram with color flow

and Doppler was performed. The study quality was technically adequate. There

is no prior echocardiogram noted for this patient. The patient was in sinus

bradycardia with heart rates between 46-59 bpm during the exam.

Left Ventricle:   The left ventricle is normal in size. Left ventricular wall

thickness is mildly increased. The LVOT diameter is 2.0 cm. The LVOT velocity

is 1.1 m/s. There is mild-moderate concentric left ventricular hypertrophy. A

false chord is noted (normal variant). The ejection fraction is estimated to

be 60-65%. There is proximal mid posteriolateral wall hypokinesis. Assessment

of diastolic parameters indicates normal left ventricular diastolic function

and normal filling pressures.

Right Ventricle:   The right ventricle is normal in size and function.

Atria:   The left atrium is mildly dilated. Right atrial size is normal.

There is no Doppler evidence for an interatrial shunt.

Mitral Valve:   The mitral valve is normal in structure and function. There

is trace mitral regurgitation.

Aortic Valve:   The aortic valve is mildly calcified. A bicuspid aortic valve

cannot be excluded. The peak aortic velocity is 3.2 m/sec. The aortic valve

mean gradient is 23 mmHg. The aortic valve area indexed to the BSA is 0.54 .

The calculated aortic valve area is 1.0 cm2. There is moderate aortic

stenosis. There is trace aortic regurgitation.

Tricuspid Valve:   The tricuspid valve is normal in structure and function.

There is a trace or physiologic amount of tricuspid regurgitation. The right

ventricular systolic pressure is estimated at least 19 mmHg assuming a right

atrial pressure of 3 mm Hg.

Pulmonic Valve:   The pulmonic valve is not well visualized.

Great Vessels:   The aortic root is normal size. The ascending aorta is

moderately enlarged. The aortic arch is normal in size. The IVC is of normal

diameter and collapses greater than 50% with a sniff. This suggests a low

right atrial pressure of 3 mm Hg.

Pericardium/ Pleura   There is no pericardial effusion. There is no pleural

effusion.

 

MMode/2D Measurements & Calculations

LVIDd: 4.6 cm                               RA long axis  LVOT diam: 2.0 cm

LVIDs: 3.0 cm         LA A2 area: 20.4 cm                 AoV Opening

FS: 36.2 %            LA A4 area: 18.7 cm   RA area

EPSS: 0.53 cm         LA length (vol)                     Ao root diam

IVSd: 1.2 cm                                : 14.5 cm

LVPWd: 1.1 cm         LA vol: 72.7 ml       RA vol        Aortic Jxn: 3.1 cm

                      LA vol index          : 35.1 ml     asc Aorta Diam

                                            RA VI

                                            : 19.4 mm2    Ao Arch Diam (Prox

                      IVC diam: 2.0 cm                    Trans): 3.1 cm

 

       _______________________________________________________________

LV velazquez. diameter/BSA LV sys. diameter/BSA  RVD1 (basal)  RVD2 (mid): 2.7 cm

(cm/m^2): 2.6         (cm/m^2): 1.6

 

       _______________________________________________________________

TAPSE: 2.4 cm

 

Doppler Measurements & Calculations

Ao V2 max         MV E max scotty          MV E/A: 1.2        TR max scotty

: 319.8 cm/sec    : 86.1 cm/sec         Med Peak E' Scotty    : 202.6 cm/sec

Ao max PG         MV A max scotty                             TR max PG

: 40.9 mmHg       : 70.8 cm/sec         E/E' med: 11.2     : 16.4 mmHg

Ao mean PG        MV P1/2t: 50.2 msec   Lat Peak E' Scotty    PA V2 max

: 22.8 mmHg                                                : 77.9 cm/sec

LVOT Max Scotty                            E/E' lat: 6.5      PA mean PG

: 106.0 cm/sec                          E/e' average: 8.8

                                                           PA Accel Time

MYRNA(I,D): 0.97 cm                                          : 0.14 sec

AS sev ratio

 

       _______________________________________________________________

 

MV dec time       MV P1/2t max scotty      Ao V2 mean         LV V1 max PG

: 0.17 sec                              : 229.1 cm/sec

                  MVA(P1/2t): 4.4 cm2   Ao V2 VTI: 82.8 cm LV V1 VTI

                                        MYRNA(V,D): 1.1 cm2  : 25.0 cm

       _______________________________________________________________

 

PA V2 mean        MYRNA indexed to BSA

: 55.4 cm/sec     (cm^2/m^2): 0.54

 

______________________________________________________________________________

 

                  Electronically signed by: Ernesto Dewitt on 2017

Reading Physician:05:09 PM

## 2017-07-29 NOTE — NUR
Pt returned from cath lab at approx 1415hrs



Pt returned to CCU at 1415hrs.  She had diagnostic heart cath and has a right groin site 
with starclose.  VS have been stable, HR remains SR in the 50's.  Groin site CDI no sign of 
hematoma or bleeding.   at bedside and MD was able to update both pt and her  
on the results and plan of care.

## 2017-07-30 VITALS
DIASTOLIC BLOOD PRESSURE: 48 MMHG | OXYGEN SATURATION: 99 % | SYSTOLIC BLOOD PRESSURE: 114 MMHG | HEART RATE: 63 BPM | RESPIRATION RATE: 13 BRPM

## 2017-07-30 VITALS
HEART RATE: 58 BPM | DIASTOLIC BLOOD PRESSURE: 55 MMHG | OXYGEN SATURATION: 98 % | RESPIRATION RATE: 18 BRPM | SYSTOLIC BLOOD PRESSURE: 122 MMHG

## 2017-07-30 VITALS
RESPIRATION RATE: 16 BRPM | HEART RATE: 53 BPM | SYSTOLIC BLOOD PRESSURE: 133 MMHG | DIASTOLIC BLOOD PRESSURE: 56 MMHG | OXYGEN SATURATION: 99 %

## 2017-07-30 VITALS — HEART RATE: 66 BPM

## 2017-07-30 VITALS — HEART RATE: 61 BPM

## 2017-07-30 LAB
BASOPHILS % (AUTO): 0.4 % (ref 0–3)
EOSINOPHILS % (AUTO): 3.1 % (ref 0–5)
MAGNESIUM: 1.9 MG/DL (ref 1.6–2.6)
MCH RBC QN AUTO: 28.7 PG (ref 27–35)
MEAN CORPUSCULAR VOLUME: 87.8 FL (ref 81–100)
MONOCYTES % (AUTO): 8 % (ref 4–12)
NEUTROPHILS NFR BLD AUTO: 68.6 % (ref 40–74)
PLATELET COUNT: 230 BIL/L (ref 150–400)

## 2017-07-30 RX ADMIN — FOLIC ACID SCH MLS/HR: 5 INJECTION, SOLUTION INTRAMUSCULAR; INTRAVENOUS; SUBCUTANEOUS at 05:06

## 2017-07-30 RX ADMIN — Medication SCH ML: at 01:00

## 2017-07-30 RX ADMIN — Medication SCH ML: at 08:08

## 2017-07-30 NOTE — PROG NOTE
[address] [address]

 

                                  PROGRESS NOTE

 

PATIENT:  CAIO PEREZ                                         : 1960

ACCOUNT#: [id number]                                            MR#: [id number]

ADMIT:    2017

JOB ID:   [id number]

 

 

DATE:

2017

 

SUBJECTIVE:

The patient is doing well.  She had an uneventful night last night and has had

no recurrent anginal chest discomfort.

 

OBJECTIVE:

Her vital signs are stable with pulse rates in the 60s and blood pressure in

the 115-130 range.  Right groin site is unremarkable.

 

ASSESSMENT AND PLAN:

I spent some time reviewing with the patient the angiographic findings from

yesterday and the reason angioplasty or stenting was unnecessary.  We talked

about the presence of atherosclerotic plaque as well as the likelihood of

intermittent coronary artery spasm as a cause for her acute coronary syndrome.

I have reviewed with her the benefits of anti-platelet therapy with aspirin and

clopidogrel, and we also talked about the need for medication to prevent repeat

coronary spasm and currently she is taking 10 mg of amlodipine, but I probably

want her to go home on 5 mg of amlodipine daily and after a week or so on that

dose if her blood pressure tolerates it, it could be increased to 10 mg daily.

Finally we talked about the importance of statin therapy, which will reduce the

likelihood of recurrent coronary artery syndrome and progressive

atherosclerosis and also likely reduce coronary inflammation in the likelihood

of repeat coronary spasm.  Her transaminase levels are elevated abnormally and

therefore will need to exercise caution and monitor her liver function tests

closely as we initiate statin therapy.  I have reviewed this with the house

staff and the patient as well.  I will ask my office to contact her to schedule

a follow-up visit in [place] with myself or one of my partners sometime in

the next couple of months.  She may benefit from engaging in a cardiac rehab

program at [facility] perhaps, and I have advised her to take a

week off from work and see if she can reduce her workload some.  I appreciate

the opportunity of assisting with Mrs. Perez's care and look forward to

following up on her care as an outpatient.

## 2017-07-30 NOTE — NUR
Discharge



Pt D/Cd home in stable condition. Reviewed new meds/follow up and groin site instructions 
with pt, no questions at this time. Pt has appt with primary care MD in a couple weeks 
already, she states. Rx hardcopies given to patient, along with handouts. TELE and PIV x2 
D/Cd intact.  to pick pt up, wheeled out to curb by CNA.

## 2017-07-30 NOTE — NUR
Tele/Groin site



Patient in sinus rhythm/sinus lon overnight. Patient asleep without incident. Right groin 
site soft, minimally tender, no bruising or drainage noted. Dorsalis pedis pulses 2+ 
bilaterally. Continue to monitor.

## 2017-07-30 NOTE — PCM.DIMED
Yonny Bean DO 7/30/17 1058:


Discharge Instructions


Date of Service


Jul 30, 2017


Dates of Hospitalization


Jul 28, 2017 at 15:23





Discharge Diagnosis


Discharge Diagnosis


Possible Acute NSTEMI, POA, active. When you arrived we were very concerned 

that you might be having a heart attack; however the catheterization procedure 

revealed that your arteries in your heart are doing fairly well. We still 

cannot entirely rule out that you may have had a small heart attack, but at 

this point it appears more likely that you are having "Coronary Vasospasm", 

which means that the blood vessels in your heart will spasm and clamp down, 

this mimics a heart attack but is not actively dangerous. Moving forward we are 

going to use a medication that both relaxes your coronary arteries, and lowers 

your blood pressure which reduces the stress on your heart. This should help 

reduce your symptoms, we will also send you home with Nitroglycerin tablets, 

use these whenever you have chest pain to get some relief.





Elevated liver transaminases,  Present on admission, stable: You liver enzymes 

were elevated, not dangerously so, but enough that you should follow up with 

your primary care doctor for further evaluation as to what may be the cause of 

this elevation.





Prediabetes, POA, stable; Continue to be disciplines with your diet, I imagine 

that in the past your blood sugars were much higher before you lost so much 

weight.





Normocytic anemia, present on admission, stable: You are mildly anemic, this is 

very common in people who have had bariatric surgery.





Hypothyroidism, POA. chronic. Presumed Stable





Depression,POA chronic. Stable





Migraines, POA, chronic. Stable


.





Medication Instructions


Additional med instructions


We will be adding a few medications


Amlodipine 5 mg daily


Nitroglycerin sub-lingual tablets, put one under your tongue is you have chest 

pain


Atorvastatin, this is a cholesterol drug that will help reduce the risk of 

future heart problems.


Aspirin 162 mg daily, Plavix 75 mg daily; these are anti-platelet medications 

that will help thin your blood to reduce the risk of heart attack in the future





Test Results


Test Results


Cardiac catheterization procedure





COMMENT:


This patient had a very high preprocedure likelihood of significant coronary


disease given her typical chest discomfort and elevated enzymes; although ECG


was unremarkable; and echocardiogram has only a limited region of hypokinesis.


The circumflex artery appears atypical but overall there is limited evidence of


atherosclerotic plaquing at all.  There are no lesions for revascularization or


intervention.  The left circumflex artery tapers prominently but the only


lesion seen is in the very small left posterolateral branch.  Spasm is also


considered; but the circumflex did not respond to  mcg IC.


 


Eddie Willams MD 07/29/17 1504





ECHO results





Interpretation Summary


The left ventricle is normal in size.


There is mild-moderate concentric left ventricular hypertrophy.


The ejection fraction is estimated to be 60-65%.


There is proximal mid posteriolateral wall hypokinesis.


The right ventricle is normal in size and function.


The aortic valve is mildly calcified.


A bicuspid aortic valve cannot be excluded.


There is moderate aortic stenosis.


No other echocardiographic abnormalities seen.


.





Diet


Discharge Diet:  Heart Healthy





Activity


Discharge Activity:  Limited until seen by PCP (Take it easy for a week or so, 

return to work no sooner than Wednesday)





Call your provider


Call your provider for:  Fever or Chills, Shortness of breath, Bleeding, Chest 

pain, Vomitting, Excessive diarrhea, Weakness (unilateral)





Patient Instructions


Follow-up plan


Please follow up with your primary care provider in 2 weeks.


Follow-up Provider:  Augusta Dailey DO


Follow-up with PCP in:  2 weeks





Erika King DO 7/30/17 1309:


Discharge Instructions


Attending's Statement


The patient was seen and examined together with Dr. Bean on 7/30/17 and I 

agree with the history, exam and plan as outlined in the note above.








Yonny Bean DO Jul 30, 2017 10:58


Erika King DO Jul 30, 2017 13:09

## 2017-07-30 NOTE — PCM.DC.MED
Discharge Summary


Date of Service


Jul 30, 2017


Dates of Hospitalization


Date of Hospital Admission


Jul 28, 2017 at 15:23


Date of Discharge:  Jul 30, 2017


Providers:


Admitting Physician: Erika King DO


Primary Care Physician: Augusta Dailey DO 


Attending Physician: Erika King DO





Diagnosis at Time of Discharge


Diagnosis at Time of Discharge


Possible Acute NSTEMI, POA, active. When you arrived we were very concerned 

that you might be having a heart attack; however the catheterization procedure 

revealed that your arteries in your heart are doing fairly well. We still 

cannot entirely rule out that you may have had a small heart attack, but at 

this point it appears more likely that you are having "Coronary Vasospasm", 

which means that the blood vessels in your heart will spasm and clamp down, 

this mimics a heart attack but is not actively dangerous. Moving forward we are 

going to use a medication that both relaxes your coronary arteries, and lowers 

your blood pressure which reduces the stress on your heart. This should help 

reduce your symptoms, we will also send you home with Nitroglycerin tablets, 

use these whenever you have chest pain to get some relief.





Elevated liver transaminases,  Present on admission, stable: You liver enzymes 

were elevated, not dangerously so, but enough that you should follow up with 

your primary care doctor for further evaluation as to what may be the cause of 

this elevation.





Prediabetes, POA, stable; Continue to be disciplines with your diet, I imagine 

that in the past your blood sugars were much higher before you lost so much 

weight.





Normocytic anemia, present on admission, stable: You are mildly anemic, this is 

very common in people who have had bariatric surgery.





Hypothyroidism, POA. chronic. Presumed Stable





Depression,POA chronic. Stable





Migraines, POA, chronic. Stable


.





Consultations


Cardiology with Dr. Dewitt and Dr. Willams





Procedures


ECG 12 Lead


NSR with rate 55, no acute ST elevation


Cardiac Echo Impression


Interpretation Summary


The left ventricle is normal in size.


There is mild-moderate concentric left ventricular hypertrophy.


The ejection fraction is estimated to be 60-65%.


There is proximal mid posteriolateral wall hypokinesis.


The right ventricle is normal in size and function.


The aortic valve is mildly calcified.


A bicuspid aortic valve cannot be excluded.


There is moderate aortic stenosis.


No other echocardiographic abnormalities seen.





 Electronically signed by: Ernesto Dewitt on 07/29/2017


Reading Physician:05:09 PM


.


Invasive Procedures


Coronary Angiography


FINDINGS:


1.     LMCA intact.


2.     LAD:  Intact.  The distribution includes one large high diagonal branch.


3.     LCX:  The left circumflex coronary artery is a large vessel.  There


is prominent tapering distally and the smaller distal LPL branch has two


branches.  The superior branch is 1 mm but has a tubular segment with an


apparent 90% lesion but NICHOLE-3 flow.


4.     RCA:  Dominant.  Intact.  The RCA is a large vessel with a moderate


sized PDA and a moderate sized but extensive RPLB distribution that reaches


to the apex and lateral wall.


 


CONCLUSIONS:


Coronary angiogram -- Overall near normal coronary angiogram with


atherosclerotic narrowing limited to a lesion of a very small distal branch of


the left posterolateral vessel.


 


RECOMMENDATIONS:


1.     ASA -- indefinitely.


2.     Consider Plavix for ACS (acute coronary syndrome).


3.     Consider treatment for spasm (notes severe headache with nitrates and


bradycardia that may limit beta-blocker use).


 


COMMENT:


This patient had a very high preprocedure likelihood of significant coronary


disease given her typical chest discomfort and elevated enzymes; although ECG


was unremarkable; and echocardiogram has only a limited region of hypokinesis.


The circumflex artery appears atypical but overall there is limited evidence of


atherosclerotic plaquing at all.  There are no lesions for revascularization or


intervention.  The left circumflex artery tapers prominently but the only


lesion seen is in the very small left posterolateral branch.  Spasm is also


considered; but the circumflex did not respond to  mcg IC.


 


Eddie Willams MD 07/29/17 [id number]


.





Brief History


Rosemary Perez is a 56 year old woman with a PMH of morbid obesity since resolved 

following gastric banding who presented with exertional substernal chest pain 

with a positive and initially uptrending troponin. The patient underwent 

coronary angiography, and despite what all agreed would be a high pre-test 

probability of CAD the Cath was relatively unremarkable. Thus it appears that 

the patient is likely suffering from significant coronary vasospasm. Prior to 

DC the patient's chest pain had resolved and she had returned to more or less 

her baseline health with a some expected post procedural fatigue and soreness.





Hospital Course


This is a 56 year old female with a hx of hypothyroidism, who presents from the 

[facility] ED after an episode of severe chest pain this morning while at work. 

She had positive troponin in the ED (1.28 increasing to 2.44), and was 

transferred for further eval of NSTEMI. 





Coronary vasospasms (Prinzmetal angina)


- Per recommendations of cardiology


                 Continue atorvastatin 40 mg. 


                 Stop metoprolol tartrate BID, 


        Continue Amlodipine 5 mg


                 Continue on plavix 75 mg daily for one year


                 Daily aspirin 162 mg.  





Possible Acute NSTEMI, POA, active. 


-EKG at [facility] showed no ST changes. Initial troponins positive. 


-Prior to transfer, Received ASA, NTGx3, MS x2 in ED at [facility]. 


- Initial trop elevated and uptrending


- Repeat EKG showed NSR with no ST segment changes


- Echo shows 60-65%, aortic stenosis, mild-moderate concentric left ventricular 

hypertrophy. (as above)


- Patient became symptomatic around noon on 7/29 and angiogram was completed at 

that time.  No significant findings.





Elevated liver transaminases,  Present on admission, stable


- Source currently unknown


- Possibly due to sertraline or levothyroxine, however unlikely


- Alcoholic AST > ALT pattern, patient denies excessive EtOH


- Patient should follow-up with PCP especially in conjunction with statin 

initiation





Prediabetes, POA, stable


- Hemoglobin A1c 5.8. 


- Glucose 100-132 





Normocytic anemia, present on admission, stable


- Hemoglobin 11.4


- Likely dilutional given increased fluid status 





Hypothyroidism, POA. chronic. Presumed Stable


-TSH 2.0, free T4 1 0.38


-Continued home levothyroxine dose





Depression,POA chronic. Stable


-continued on home dose of sertraline. 





Migraines, POA, chronic. Stable


-Patient uses Gabapentin 300 mg PRN for migraines which apparently works well 

for her


.





Exam





 Vital Signs (Last)








  Date Time  Temp Pulse Resp B/P Pulse Ox O2 Delivery O2 Flow Rate FiO2


 


7/30/17 08:48  61      


 


7/30/17 08:06 36.8  13 114/48 99 Room Air  


 


7/29/17 12:02       2.00 








Exam


General: pleasant female, resting comfortably on the gurney, awake, no obvious 

distress, conversing normally


HEENT: PERRLA, EOMI, nonicteric, membranes moist, autramatic, normocephalic 


Neck: Supple, non tender, no thyromegaly, no JVD


Cardio: bradycardic, normal rhythm, with 2/6 systolic murmur. No S3/4. Normal 

cap refill. +2/4 dp and radial pulses bilaterally. 


Respiratory: CTA bilaterally, with no wheezes, rales, or rhonchi


Abdomen: Soft, normal bowel tones, nontender, nondistended


Extremities: no edema, no signs of trauma


Psych: Appropriate mood and affect. 


Neuro: CN II through XII grossly intact, sensation intact throughout. Moving 

all extremities well. Speech is fluent and appropriate. 


Skin: no bruising, erythema, or ulcers. 


.











Test


  7/28/17


16:00 7/29/17


02:30 7/29/17


04:16 7/29/17


21:58


 


Prothrombin Time


  10.7sec


(8.1-12.5) 


  


  


 


 


Prothromb Time International


Ratio 1.00ratio 


  


  


  


 


 


Hemoglobin A1c 5.8% (4.8-5.6)    


 


Total Creatine Kinase


  420U/L


() 


  


  


 


 


Creatine Kinase MB


  51.1ng/mL


(0.0-5.3) 


  


  


 


 


Creatine Kinase MB %


  12.2%


(0.0-5.0) 


  


  


 


 


Thyroid Stimulating Hormone


(TSH) 2.010uIU/mL


(0.450-4.500) 


  


  


 


 


Free Thyroxine


  1.38ng/dL


(0.82-1.77) 


  


  


 


 


Triglycerides Level


  


  51mg/dL


(0-149) 


  


 


 


Cholesterol Level


  


  148mg/dL


(100-199) 


  


 


 


LDL Cholesterol, Calculated


  


  67.800mg/dL


(0-99) 


  


 


 


VLDL Cholesterol  10.200mg/dL   


 


HDL Cholesterol  70mg/dL (>39)   


 


Cholesterol/HDL Ratio  2.11 (0.0-4.4)   


 


Troponin T


  


  


  1.00ug/L


(0.0-0.011) 


 


 


Activated Partial


Thromboplast Time 


  


  


  25.0sec


(22.8-33.0)














Test


  7/30/17


03:30 7/30/17


08:55 


  


 


 


White Blood Count


  7.8th/mm3


(3.8-10.1) 


  


  


 


 


Red Blood Count


  4.35mil/mm3


(3.90-5.20) 


  


  


 


 


Mean Corpuscular Volume


  87.8fL


() 


  


  


 


 


Mean Corpuscular Hemoglobin


  28.7pg


(27.0-35.0) 


  


  


 


 


Mean Corpuscular Hemoglobin


Concent 32.7%


(32.0-37.0) 


  


  


 


 


Red Cell Distribution Width


  13.0%


(12.3-15.4) 


  


  


 


 


Platelet Count


  230bil/L


(150-400) 


  


  


 


 


Neutrophils (%) (Auto) 68.6% (40-74)    


 


Lymphocytes (%) (Auto) 19.8% (14-46)    


 


Monocytes (%) (Auto) 8.0% (4-12)    


 


Eosinophils (%) (Auto) 3.1% (0-5)    


 


Basophils (%) (Auto) 0.4% (0-3)    


 


Phosphorus Level


  2.8mg/dL


(2.5-4.9) 


  


  


 


 


Magnesium Level


  1.9mg/dL


(1.6-2.6) 


  


  


 


 


Total Bilirubin


  0.4mg/dL


(0.0-1.2) 


  


  


 


 


Aspartate Amino Transf


(AST/SGOT) 132U/L (0-50) 


  


  


  


 


 


Alanine Aminotransferase


(ALT/SGPT) 83U/L (0-32) 


  


  


  


 


 


Alkaline Phosphatase 58U/L ()    


 


Total Protein


  6.5g/dL


(6.4-8.4) 


  


  


 


 


Albumin


  3.6g/dL


(3.4-5.0) 


  


  


 


 


Hemoglobin


  


  12.7g/dL


(12.0-15.6) 


  


 


 


Hematocrit


  


  39.1%


(35.0-46.0) 


  


 


 


Sodium Level


  


  140mEq/L


(134-144) 


  


 


 


Potassium Level


  


  3.9mEq/L


(3.5-5.2) 


  


 


 


Chloride Level


  


  102mEq/L


() 


  


 


 


Carbon Dioxide Level


  


  20mmol/L


(18-29) 


  


 


 


Blood Urea Nitrogen  7mg/dL (6-24)   


 


Creatinine


  


  0.51mg/dL


(0.57-1.00) 


  


 


 


Estimat Glomerular Filtration


Rate 


  177mL/min


(>59) 


  


 


 


Glucose Level


  


  100mg/dL


(60-99) 


  


 


 


Calcium Level


  


  8.9mg/dL


(8.5-10.1) 


  


 











Discharge Medications


Discharge Medications


Amlodipine (Amlodipine) 5 Mg Tablet 5 MG PO DAILY 


   Prescribed by: FERN BEAN DO


Aspirin Chew (Aspirin Chew) 81 Mg Chew 162 MG PO DAILY 


   Prescribed by: FERN BEAN DO


Atorvastatin Calcium (Atorvastatin Calcium) 20 Mg Tablet 40 MG PO HS 


   Prescribed by: FENR BEAN DO


Clopidogrel (Clopidogrel) 75 Mg Tablet 75 MG PO DAILY 


   Prescribed by: FERN BEAN DO


Levothyroxine (Levothyroxine) 88 Mcg Tablet 88 MCG PO DAILY (Reported) 


Sertraline HCl (Zoloft) 50 Mg Tablet 50 MG PO HS (Reported) 





As needed


Gabapentin (Gabapentin) 300 Mg Capsule 300 MG PO TID PRN PRN migraines (Reported

) 


Nitroglycerin SL (Nitrostat) 0.4 Mg Tab.subl 0.4 MG SL Q5MIN PRN PRN For Chest 

Pain 


   Prescribed by: FERN BEAN, 


Additional med instructions


We will be adding a few medications


Amlodipine 5 mg daily


Nitroglycerin sub-lingual tablets, put one under your tongue is you have chest 

pain


Atorvastatin, this is a cholesterol drug that will help reduce the risk of 

future heart problems.


Aspirin 162 mg daily, Plavix 75 mg daily; these are anti-platelet medications 

that will help thin your blood to reduce the risk of heart attack in the future





Followup Plan


Disposition:


Home


Follow-up plan


Please follow up with your primary care provider in 2 weeks.


Discharge Diet:  Heart Healthy


Discharge Activity:  Limited until seen by PCP (Take it easy for a week or so, 

return to work no sooner than Wednesday)


Follow-up Provider:  Augusta Dailey DO


Follow-up with PCP in:  2 weeks


Time spent


Time spent planning and coordinating DC > 35 minutes


Attending Statement


The patient was seen and examined together with Dr. Bean on 7/30/17 and I 

have added additional information to the note above.


copies to:   Augusta Dailey David E DO Jul 30, 2017 15:00


Erika King DO Jul 30, 2017 16:57

## 2023-09-29 ENCOUNTER — HOSPITAL ENCOUNTER (EMERGENCY)
Dept: HOSPITAL 76 - ED | Age: 63
Discharge: HOME | End: 2023-09-29
Payer: COMMERCIAL

## 2023-09-29 ENCOUNTER — HOSPITAL ENCOUNTER (OUTPATIENT)
Dept: HOSPITAL 76 - EMS | Age: 63
Discharge: ANOTHER HOSPITAL | End: 2023-09-29
Payer: COMMERCIAL

## 2023-09-29 VITALS — OXYGEN SATURATION: 99 % | SYSTOLIC BLOOD PRESSURE: 154 MMHG | DIASTOLIC BLOOD PRESSURE: 60 MMHG

## 2023-09-29 DIAGNOSIS — R42: Primary | ICD-10-CM

## 2023-09-29 DIAGNOSIS — I10: ICD-10-CM

## 2023-09-29 DIAGNOSIS — R00.1: ICD-10-CM

## 2023-09-29 DIAGNOSIS — Z79.899: ICD-10-CM

## 2023-09-29 DIAGNOSIS — R63.8: ICD-10-CM

## 2023-09-29 LAB
ALBUMIN DIAFP-MCNC: 3.8 G/DL (ref 3.2–5.5)
ALBUMIN/GLOB SERPL: 1.4 {RATIO} (ref 1–2.2)
ALP SERPL-CCNC: 41 IU/L (ref 42–121)
ALT SERPL W P-5'-P-CCNC: 11 IU/L (ref 10–60)
ANION GAP SERPL CALCULATED.4IONS-SCNC: 5 MMOL/L (ref 6–13)
AST SERPL W P-5'-P-CCNC: 19 IU/L (ref 10–42)
BASOPHILS NFR BLD AUTO: 0.1 10^3/UL (ref 0–0.1)
BASOPHILS NFR BLD AUTO: 0.9 %
BILIRUB BLD-MCNC: 0.3 MG/DL (ref 0.2–1)
BUN SERPL-MCNC: 13 MG/DL (ref 6–20)
CALCIUM UR-MCNC: 9.1 MG/DL (ref 8.5–10.3)
CHLORIDE SERPL-SCNC: 107 MMOL/L (ref 101–111)
CO2 SERPL-SCNC: 29 MMOL/L (ref 21–32)
CREAT SERPLBLD-SCNC: 1 MG/DL (ref 0.6–1.3)
EOSINOPHIL # BLD AUTO: 0.3 10^3/UL (ref 0–0.7)
EOSINOPHIL NFR BLD AUTO: 4.1 %
ERYTHROCYTE [DISTWIDTH] IN BLOOD BY AUTOMATED COUNT: 14.8 % (ref 12–15)
ETHANOL BLD-MCNC: < 10 MG/DL
GFRSERPLBLD MDRD-ARVRAT: 56 ML/MIN/{1.73_M2} (ref 89–?)
GLOBULIN SER-MCNC: 2.7 G/DL (ref 2.1–4.2)
GLUCOSE SERPL-MCNC: 106 MG/DL (ref 74–104)
HCT VFR BLD AUTO: 35.5 % (ref 37–47)
HGB UR QL STRIP: 11 G/DL (ref 12–16)
LIPASE SERPL-CCNC: 35 U/L (ref 11–82)
LYMPHOCYTES # SPEC AUTO: 2.3 10^3/UL (ref 1.5–3.5)
LYMPHOCYTES NFR BLD AUTO: 35.3 %
MAGNESIUM SERPL-MCNC: 1.6 MG/DL (ref 1.7–2.3)
MCH RBC QN AUTO: 26.8 PG (ref 27–31)
MCHC RBC AUTO-ENTMCNC: 31 G/DL (ref 32–36)
MCV RBC AUTO: 86.4 FL (ref 81–99)
MONOCYTES # BLD AUTO: 0.5 10^3/UL (ref 0–1)
MONOCYTES NFR BLD AUTO: 8.2 %
NEUTROPHILS # BLD AUTO: 3.4 10^3/UL (ref 1.5–6.6)
NEUTROPHILS # SNV AUTO: 6.6 X10^3/UL (ref 4.8–10.8)
NEUTROPHILS NFR BLD AUTO: 51.3 %
NRBC # BLD AUTO: 0 /100WBC
NRBC # BLD AUTO: 0 X10^3/UL
PDW BLD AUTO: 9 FL (ref 7.9–10.8)
PLATELET # BLD: 154 10^3/UL (ref 130–450)
POTASSIUM SERPL-SCNC: 3.6 MMOL/L (ref 3.5–4.5)
PROT SPEC-MCNC: 6.5 G/DL (ref 6.4–8.9)
RBC MAR: 4.11 10^6/UL (ref 4.2–5.4)
SODIUM SERPLBLD-SCNC: 141 MMOL/L (ref 135–145)

## 2023-09-29 PROCEDURE — 80320 DRUG SCREEN QUANTALCOHOLS: CPT

## 2023-09-29 PROCEDURE — 84484 ASSAY OF TROPONIN QUANT: CPT

## 2023-09-29 PROCEDURE — 36415 COLL VENOUS BLD VENIPUNCTURE: CPT

## 2023-09-29 PROCEDURE — 83735 ASSAY OF MAGNESIUM: CPT

## 2023-09-29 PROCEDURE — 85025 COMPLETE CBC W/AUTO DIFF WBC: CPT

## 2023-09-29 PROCEDURE — 99284 EMERGENCY DEPT VISIT MOD MDM: CPT

## 2023-09-29 PROCEDURE — 93005 ELECTROCARDIOGRAM TRACING: CPT

## 2023-09-29 PROCEDURE — 83690 ASSAY OF LIPASE: CPT

## 2023-09-29 PROCEDURE — 80053 COMPREHEN METABOLIC PANEL: CPT

## 2023-09-29 NOTE — EXTERNAL MEDICAL SUMMARY RPT
Continuity of Care Document



                         Created on: 2023





REYNALDO MCDONALD

External Reference #: [id number]

: 1960

Sex: Female



Demographics





                                        Address             [address] [place] [address]  [address]

 

                                        Phone               Unavailable

 

                                        Preferred Language  English

 

                                        Marital Status      

 

                                        Islam Affiliation Unknown

 

                                        Race                Unknown

 

                                        Ethnic Group        Not  or Lati

no





Author





                                        Name                Unknown

 

                                        [place]  [address]

 

                                        Phone               [phone]

 

                                        Organization        [facility]

 

                                        [place]  [address]

 

                                        Phone               [phone]





Care Team Providers





                                Care Team Member Name Role            Phone

 

                                No Cooney    Unavailable     Unavailable







Allergies and Intolerances





                      date       description facility   reaction   severity

 

                      2023 11:34:19 [facility] (no reactio

n) Severe







Results/Labs





                    test      date      facility  value     unit      notes







                                        

 

                                        

 

                                        

 

                                        

 

                                        







Social History





                                date            description     facility

 

                                2023 00:00 Never smoked tobacco ([facility]







Vital Signs





                          date         measurement  value        units

 

                          2023 00:00 BMI          24.8         kg/m2

 

                          2023 00:00 BP_diastolic 84           mmHg

 

                          2023 00:00 BP_systolic  132          mmHg

 

                          2023 00:00 heart_rate   57           /min

 

                          2023 00:00 height_metric 167.64       cm

 

                          2023 00:00 height_standard 66           in

 

                          2023 00:00 o2_saturation 97           %

 

                          2023 00:00 weight_metric 69.85        kg

 

                          2023 00:00 weight_standard 153.99       lb

## 2023-09-29 NOTE — ED PHYSICIAN DOCUMENTATION
History of Present Illness





- Stated complaint


Stated Complaint: DIZZINESS





- Chief complaint


Chief Complaint: Neuro





- Additonal information


Additional information: 





Patient 62-year-old female presenting to the emergency department chief compla

int dizziness.





Reports has had intermittent episodes of dizziness ongoing for the last several 

years.  This morning while at work had an episode that was more severe than 

baseline.  Denies any chest pain or shortness of breath associated with her 

symptoms.  She endorses for past medical history significant for aortic valve 

replacement secondary to congenital bicuspid aorta.





Does report that she takes blood pressure medication including metoprolol each 

morning along with a thyroid supplement.  No recent changes in her medications.





States that she follows with cardiologist and had an echocardiogram done within 

the last year.  She also endorses for multiple cardiac catheterizations in the 

past which have been negative.





Review of Systems


Constitutional: denies: Fever


Eyes: denies: Loss of vision


Ears: denies: Loss of hearing


Nose: denies: Rhinorrhea / runny nose


Throat: denies: Dental pain / toothache


Cardiac: denies: Chest pain / pressure


Respiratory: denies: Dyspnea


GI: denies: Abdominal Pain





PD PAST MEDICAL HISTORY





- Past Medical History


Cardiovascular: Hypertension


Respiratory: None


Endocrine/Autoimmune: None


Psych: Depression





- Past Surgical History


Past Surgical History: Yes


General: Cholecystectomy





- Present Medications


Home Medications: 


                                Ambulatory Orders











 Medication  Instructions  Recorded  Confirmed


 


Gabapentin 300 mg PO QPM 07/28/17 09/29/23


 


Levothyroxine [Synthroid] 112 mcg PO DAILY 07/28/17 09/29/23


 


Sertraline [Zoloft] 100 mg PO DAILY 07/28/17 09/29/23


 


Cetirizine [ZyrTEC] 10 mg PO DAILY 09/29/23 09/29/23


 


Metoprolol Succinate [Toprol Xl] 25 mg PO DAILY 09/29/23 09/29/23


 


Prochlorperazine [Compazine] 5 mg PO Q6H 09/29/23 09/29/23


 


Sumatriptan Succinate [Imitrex] 100 mg PO DAILY PRN 09/29/23 09/29/23


 


Zolpidem Tartrate [Ambien Cr] 12.5 mg PO QPM PRN 09/29/23 09/29/23


 


buPROPion [Wellbutrin Sr] 150 mg PO DAILY 09/29/23 09/29/23














- Allergies


Allergies/Adverse Reactions: 


                                    Allergies











Allergy/AdvReac Type Severity Reaction Status Date / Time


 


amoxicillin Allergy  Hives Verified 09/29/23 11:36


 


erythromycin base AdvReac  Unknown Verified 09/29/23 11:36














- Social History


Does the pt smoke?: No


Smoking Status: Never smoker


Does the pt drink ETOH?: No


Does the pt have substance abuse?: No





- Immunizations


Immunizations are current?: Yes





PD ED PE NORMAL





- Vitals


Vital signs reviewed: Yes (Pulse 55)





- General


General: Alert and oriented X 3, No acute distress, Well developed/nourished





- HEENT


HEENT: Atraumatic, PERRL, EOMI, Ears normal, Moist mucous membranes, Pharynx 

benign





- Neck


Neck: Supple, no meningeal sign, No bony TTP, No adenopathy, Thyroid normal, No 

JVD





- Cardiac


Cardiac: RRR, No gallop, Strong equal pulses





- Respiratory


Respiratory: No respiratory distress, Clear bilaterally





- Abdomen


Abdomen: Normal bowel sounds, Non tender, Non distended





- Female 


Female : Deferred





- Rectal


Rectal: Deferred





- Back


Back: No CVA TTP





- Derm


Derm: Normal color





- Extremities


Extremities: No deformity, No tenderness to palpate





- Neuro


Neuro: Alert and oriented X 3, CNs 2-12 intact, No motor deficit, No sensory 

deficit, Normal speech





Results





- Vitals


Vitals: 


                               Vital Signs - 24 hr











  09/29/23 09/29/23 09/29/23





  11:12 11:57 12:58


 


Temperature 36.5 C  


 


Heart Rate 51 L 51 L 


 


Heart Rate [   47 L





Sitting]   


 


Heart Rate [   54 L





Standing]   


 


Heart Rate [   46 L





Supine]   


 


Respiratory 18 18 





Rate   


 


Blood Pressure 129/67 129/67 


 


Blood Pressure   150/67 H





[Sitting]   


 


Blood Pressure   158/72 H





[Standing]   


 


Blood Pressure   144/63 H





[Supine]   


 


O2 Saturation 98 100 








                                     Oxygen











O2 Source                      Room air

















- EKG (time done)


  ** 1115


EKG releavant findings:: 


EKG personally interpreted by author of this note. Relevant findings are: 


Sinus rhythm with rate 54 bpm.  Normal axis.  AR normal prolonged at 213 ms.  

QTc and QT intervals within normal limits.  No ST segment elevations.  

Nonspecific ST abnormalities.  Infrequent premature ventricular complexes noted.





- Labs


Labs: 


                                Laboratory Tests











  09/29/23 09/29/23 09/29/23





  11:31 11:31 11:31


 


WBC  6.6  


 


RBC  4.11 L  


 


Hgb  11.0 L  


 


Hct  35.5 L  


 


MCV  86.4  


 


MCH  26.8 L  


 


MCHC  31.0 L  


 


RDW  14.8  


 


Plt Count  154  


 


MPV  9.0  


 


Neut # (Auto)  3.4  


 


Lymph # (Auto)  2.3  


 


Mono # (Auto)  0.5  


 


Eos # (Auto)  0.3  


 


Baso # (Auto)  0.1  


 


Absolute Nucleated RBC  0.00  


 


Nucleated RBC %  0.0  


 


Sodium   141 


 


Potassium   3.6 


 


Chloride   107 


 


Carbon Dioxide   29 


 


Anion Gap   5.0 L 


 


BUN   13 


 


Creatinine   1.0 


 


Estimated GFR (MDRD)   56 L 


 


Glucose   106 H 


 


Calcium   9.1 


 


Magnesium   1.6 L 


 


Total Bilirubin   0.3 


 


AST   19 


 


ALT   11 


 


Alkaline Phosphatase   41 L 


 


Troponin I High Sens    7.5


 


Total Protein   6.5 


 


Albumin   3.8 


 


Globulin   2.7 


 


Albumin/Globulin Ratio   1.4 


 


Lipase   35 


 


Ethyl Alcohol   < 10.0 














PD Medical Decision Making





- ED course


Complexity details: reviewed old records, reviewed results, re-evaluated 

patient, considered differential, d/w patient


ED course: 





Patient 62-year-old female presenting to the emergency department with chief 

complaint dizziness.





Endorse for multiple similar episodes in the past.  Does have past medical 

significant for aortic valve replacement secondary to bicuspid aortic.





Denies any chest pain or shortness of breath associated with her symptoms.  Does

 report that she ordinarily has episodes similar to this area only in the 

morning and she does take her blood pressure medications in the morning 

including metoprolol.





EKG is on above demonstrated sinus bradycardia without indications of acute 

cardiac ischemia or dysrhythmia.  The remainder of her labs were all within 

normal limits or nonactionable.  She had normal orthostatic vital signs in the 

emergency department.  She passed an ambulatory trial.  She was monitored 

continuously on telemetry for several hours with no recurrent or worsening 

symptoms.





On reevaluation she reported feeling significantly better and denied any ongoing

 symptoms here in the emergency department.





She did report that she has had an echocardiogram done within the last year That

 she reports was normal.





I do believe it would benefit her to discontinue her beta-blocker at this time 

as I am concerned that this could be contributing to her symptoms.





I will have her follow-up with her primary care doctor as well as with her 

cardiologist.





She is instructed to return to the emergency department immediately for any new 

or worsening symptoms.





Departure





- Departure


Forms:  PCP List